# Patient Record
Sex: FEMALE | Race: BLACK OR AFRICAN AMERICAN | URBAN - METROPOLITAN AREA
[De-identification: names, ages, dates, MRNs, and addresses within clinical notes are randomized per-mention and may not be internally consistent; named-entity substitution may affect disease eponyms.]

---

## 2018-10-23 ENCOUNTER — HISTORICAL (OUTPATIENT)
Dept: ADMINISTRATIVE | Facility: HOSPITAL | Age: 28
End: 2018-10-23

## 2018-10-25 LAB — FINAL CULTURE: NORMAL

## 2024-05-26 ENCOUNTER — HOSPITAL ENCOUNTER (EMERGENCY)
Facility: HOSPITAL | Age: 34
Discharge: HOME | End: 2024-05-26
Attending: EMERGENCY MEDICINE
Payer: MEDICAID

## 2024-05-26 VITALS
TEMPERATURE: 98 F | DIASTOLIC BLOOD PRESSURE: 67 MMHG | OXYGEN SATURATION: 100 % | RESPIRATION RATE: 18 BRPM | HEART RATE: 72 BPM | BODY MASS INDEX: 44.41 KG/M2 | WEIGHT: 260.13 LBS | HEIGHT: 64 IN | SYSTOLIC BLOOD PRESSURE: 149 MMHG

## 2024-05-26 DIAGNOSIS — O21.9 NAUSEA AND VOMITING IN PREGNANCY: Primary | ICD-10-CM

## 2024-05-26 LAB
ALBUMIN SERPL-MCNC: 3.7 G/DL (ref 3.5–5)
ALBUMIN/GLOB SERPL: 1 RATIO (ref 1.1–2)
ALP SERPL-CCNC: 94 UNIT/L (ref 40–150)
ALT SERPL-CCNC: 7 UNIT/L (ref 0–55)
ANION GAP SERPL CALC-SCNC: 10 MEQ/L
AST SERPL-CCNC: 10 UNIT/L (ref 5–34)
B-HCG UR QL: POSITIVE
BACTERIA #/AREA URNS AUTO: ABNORMAL /HPF
BASOPHILS # BLD AUTO: 0.02 X10(3)/MCL
BASOPHILS NFR BLD AUTO: 0.2 %
BILIRUB SERPL-MCNC: 0.6 MG/DL
BILIRUB UR QL STRIP.AUTO: NEGATIVE
BUN SERPL-MCNC: 6.9 MG/DL (ref 7–18.7)
CALCIUM SERPL-MCNC: 9.3 MG/DL (ref 8.4–10.2)
CHLORIDE SERPL-SCNC: 106 MMOL/L (ref 98–107)
CLARITY UR: CLEAR
CO2 SERPL-SCNC: 23 MMOL/L (ref 22–29)
COLOR UR AUTO: YELLOW
CREAT SERPL-MCNC: 0.77 MG/DL (ref 0.55–1.02)
CREAT/UREA NIT SERPL: 9
CTP QC/QA: YES
EOSINOPHIL # BLD AUTO: 0.06 X10(3)/MCL (ref 0–0.9)
EOSINOPHIL NFR BLD AUTO: 0.7 %
ERYTHROCYTE [DISTWIDTH] IN BLOOD BY AUTOMATED COUNT: 12.7 % (ref 11.5–17)
GFR SERPLBLD CREATININE-BSD FMLA CKD-EPI: >60 ML/MIN/1.73/M2
GLOBULIN SER-MCNC: 3.8 GM/DL (ref 2.4–3.5)
GLUCOSE SERPL-MCNC: 90 MG/DL (ref 74–100)
GLUCOSE UR QL STRIP: NORMAL
HCT VFR BLD AUTO: 36 % (ref 37–47)
HGB BLD-MCNC: 12.1 G/DL (ref 12–16)
HGB UR QL STRIP: NEGATIVE
HOLD SPECIMEN: NORMAL
HYALINE CASTS #/AREA URNS LPF: ABNORMAL /LPF
IMM GRANULOCYTES # BLD AUTO: 0.02 X10(3)/MCL (ref 0–0.04)
IMM GRANULOCYTES NFR BLD AUTO: 0.2 %
KETONES UR QL STRIP: ABNORMAL
LEUKOCYTE ESTERASE UR QL STRIP: NEGATIVE
LIPASE SERPL-CCNC: 12 U/L
LYMPHOCYTES # BLD AUTO: 1.66 X10(3)/MCL (ref 0.6–4.6)
LYMPHOCYTES NFR BLD AUTO: 20.2 %
MCH RBC QN AUTO: 28 PG (ref 27–31)
MCHC RBC AUTO-ENTMCNC: 33.6 G/DL (ref 33–36)
MCV RBC AUTO: 83.3 FL (ref 80–94)
MONOCYTES # BLD AUTO: 0.65 X10(3)/MCL (ref 0.1–1.3)
MONOCYTES NFR BLD AUTO: 7.9 %
MUCOUS THREADS URNS QL MICRO: ABNORMAL /LPF
NEUTROPHILS # BLD AUTO: 5.82 X10(3)/MCL (ref 2.1–9.2)
NEUTROPHILS NFR BLD AUTO: 70.8 %
NITRITE UR QL STRIP: NEGATIVE
NRBC BLD AUTO-RTO: 0 %
PH UR STRIP: 8 [PH]
PLATELET # BLD AUTO: 319 X10(3)/MCL (ref 130–400)
PMV BLD AUTO: 10.1 FL (ref 7.4–10.4)
POTASSIUM SERPL-SCNC: 3.3 MMOL/L (ref 3.5–5.1)
PROT SERPL-MCNC: 7.5 GM/DL (ref 6.4–8.3)
PROT UR QL STRIP: ABNORMAL
RBC # BLD AUTO: 4.32 X10(6)/MCL (ref 4.2–5.4)
RBC #/AREA URNS AUTO: ABNORMAL /HPF
SODIUM SERPL-SCNC: 139 MMOL/L (ref 136–145)
SP GR UR STRIP.AUTO: 1.03 (ref 1–1.03)
SQUAMOUS #/AREA URNS LPF: ABNORMAL /HPF
UROBILINOGEN UR STRIP-ACNC: ABNORMAL
WBC # SPEC AUTO: 8.23 X10(3)/MCL (ref 4.5–11.5)
WBC #/AREA URNS AUTO: ABNORMAL /HPF

## 2024-05-26 PROCEDURE — 63600175 PHARM REV CODE 636 W HCPCS: Performed by: PHYSICIAN ASSISTANT

## 2024-05-26 PROCEDURE — 99284 EMERGENCY DEPT VISIT MOD MDM: CPT

## 2024-05-26 PROCEDURE — 81001 URINALYSIS AUTO W/SCOPE: CPT | Performed by: PHYSICIAN ASSISTANT

## 2024-05-26 PROCEDURE — 96361 HYDRATE IV INFUSION ADD-ON: CPT

## 2024-05-26 PROCEDURE — 25000003 PHARM REV CODE 250: Performed by: PHYSICIAN ASSISTANT

## 2024-05-26 PROCEDURE — 96374 THER/PROPH/DIAG INJ IV PUSH: CPT

## 2024-05-26 PROCEDURE — 85025 COMPLETE CBC W/AUTO DIFF WBC: CPT | Performed by: PHYSICIAN ASSISTANT

## 2024-05-26 PROCEDURE — 81025 URINE PREGNANCY TEST: CPT | Performed by: EMERGENCY MEDICINE

## 2024-05-26 PROCEDURE — 80053 COMPREHEN METABOLIC PANEL: CPT | Performed by: PHYSICIAN ASSISTANT

## 2024-05-26 PROCEDURE — 83690 ASSAY OF LIPASE: CPT | Performed by: PHYSICIAN ASSISTANT

## 2024-05-26 RX ORDER — ONDANSETRON 4 MG/1
4 TABLET, ORALLY DISINTEGRATING ORAL EVERY 8 HOURS PRN
Qty: 15 TABLET | Refills: 0 | Status: SHIPPED | OUTPATIENT
Start: 2024-05-26

## 2024-05-26 RX ORDER — METOCLOPRAMIDE HYDROCHLORIDE 5 MG/ML
10 INJECTION INTRAMUSCULAR; INTRAVENOUS
Status: COMPLETED | OUTPATIENT
Start: 2024-05-26 | End: 2024-05-26

## 2024-05-26 RX ORDER — LANOLIN ALCOHOL/MO/W.PET/CERES
50 CREAM (GRAM) TOPICAL DAILY
Qty: 30 TABLET | Refills: 0 | Status: SHIPPED | OUTPATIENT
Start: 2024-05-26

## 2024-05-26 RX ADMIN — SODIUM CHLORIDE 1000 ML: 9 INJECTION, SOLUTION INTRAVENOUS at 10:05

## 2024-05-26 RX ADMIN — METOCLOPRAMIDE HYDROCHLORIDE 10 MG: 5 INJECTION INTRAMUSCULAR; INTRAVENOUS at 10:05

## 2024-05-26 NOTE — Clinical Note
"Linn Bartlett" Karely was seen and treated in our emergency department on 5/26/2024.  She may return to work on 05/27/2024.       If you have any questions or concerns, please don't hesitate to call.       RN    "

## 2024-05-26 NOTE — ED PROVIDER NOTES
Encounter Date: 2024       History     Chief Complaint   Patient presents with    Vomiting    Nausea     Pt 9 weeks pregnant co persistant nausea and vomiting , given phenergen by her ob but did not take today , negative orthostatic vitals      Linn Cali is a 33 y.o. A1  who is approximately 9 weeks pregnant who presents to the ED complaining of nausea and vomiting x 4 days. Reports she has been unable to keep anything down. Notified her OB who prescribed her phenergan, but she has been vomiting it up so it is not helping. Denies abdominal pain, vaginal bleeding, dysuria, fevers/chills.     The history is provided by the patient.     Review of patient's allergies indicates:  No Known Allergies  No past medical history on file.  No past surgical history on file.  No family history on file.     Review of Systems   Constitutional:  Negative for chills and fever.   HENT:  Negative for congestion and sore throat.    Respiratory:  Negative for cough and shortness of breath.    Cardiovascular:  Negative for chest pain and leg swelling.   Gastrointestinal:  Positive for nausea and vomiting. Negative for abdominal pain.   Genitourinary:  Negative for dysuria.   Musculoskeletal:  Negative for back pain.   Skin:  Negative for rash.   Neurological:  Negative for weakness.   Hematological:  Does not bruise/bleed easily.       Physical Exam     Initial Vitals   BP Pulse Resp Temp SpO2   24 0843 24 0843 24 0852 24 0843 24 0843   (!) 149/84 91 18 97.9 °F (36.6 °C) 99 %      MAP       --                Physical Exam    Nursing note and vitals reviewed.  Constitutional: She appears well-developed and well-nourished. No distress.   HENT:   Head: Normocephalic and atraumatic.   Mouth/Throat: No oropharyngeal exudate.   Eyes: EOM are normal. No scleral icterus.   Neck: Neck supple.   Normal range of motion.  Cardiovascular:  Normal rate and regular rhythm.           No murmur  heard.  Pulmonary/Chest: Breath sounds normal. No respiratory distress. She has no wheezes.   Abdominal: Abdomen is soft. Bowel sounds are normal. She exhibits no distension. There is no abdominal tenderness. There is no rebound and no guarding.   Musculoskeletal:         General: No tenderness. Normal range of motion.      Cervical back: Normal range of motion and neck supple.     Neurological: She is alert and oriented to person, place, and time. No cranial nerve deficit.   Skin: Skin is warm and dry. Capillary refill takes less than 2 seconds. No erythema.   Psychiatric: She has a normal mood and affect. Her behavior is normal. Judgment and thought content normal.         ED Course   Procedures  Labs Reviewed   COMPREHENSIVE METABOLIC PANEL - Abnormal; Notable for the following components:       Result Value    Potassium 3.3 (*)     Blood Urea Nitrogen 6.9 (*)     Globulin 3.8 (*)     Albumin/Globulin Ratio 1.0 (*)     All other components within normal limits   URINALYSIS, REFLEX TO URINE CULTURE - Abnormal; Notable for the following components:    Specific Gravity, UA 1.032 (*)     Protein, UA 1+ (*)     Ketones, UA Trace (*)     Urobilinogen, UA 3+ (*)     Bacteria, UA Trace (*)     Squamous Epithelial Cells, UA Few (*)     Mucous, UA Few (*)     All other components within normal limits   CBC WITH DIFFERENTIAL - Abnormal; Notable for the following components:    Hct 36.0 (*)     All other components within normal limits   POCT URINE PREGNANCY - Abnormal; Notable for the following components:    POC Preg Test, Ur Positive (*)     All other components within normal limits   LIPASE - Normal   CBC W/ AUTO DIFFERENTIAL    Narrative:     The following orders were created for panel order CBC auto differential.  Procedure                               Abnormality         Status                     ---------                               -----------         ------                     CBC with Differential[[id number]]         Abnormal            Final result                 Please view results for these tests on the individual orders.   EXTRA TUBES    Narrative:     The following orders were created for panel order EXTRA TUBES.  Procedure                               Abnormality         Status                     ---------                               -----------         ------                     Light Blue Top Hold[[id number]]                              In process                 Red Top Hold[[id number]]                                     In process                 Gold Top Hold[[id number]]                                    In process                 Pink Top Hold[[id number]]                                    In process                   Please view results for these tests on the individual orders.   LIGHT BLUE TOP HOLD   RED TOP HOLD   GOLD TOP HOLD   PINK TOP HOLD          Imaging Results    None          Medications   sodium chloride 0.9% bolus 1,000 mL 1,000 mL (0 mLs Intravenous Stopped 5/26/24 1207)   metoclopramide injection 10 mg (10 mg Intravenous Given 5/26/24 1043)     Medical Decision Making  Differential: hyperemesis gravidarum, nausea and vomiting in pregnancy, viral gastroenteritis, among others    ED management: HDS and afebrile. Abdomen soft, non-tender. No rebound or guarding. Labs unremarkable. UA without infection.  bpm. Pt symptoms improved with IVF and reglan. Pt has appointment with her OBGYN, Dr. Mortensen, on Wednesday. Will presribe short course of zofran. Instructed to start taking vitamin B6 and unisom nightly. ED return precautions given. She verbalized understanding. All questions answered.     Amount and/or Complexity of Data Reviewed  Labs: ordered.    Risk  OTC drugs.  Prescription drug management.               ED Course as of 05/26/24 1208   Sun May 26, 2024   1115 Bedside US performed.  bpm [KD]   1159 Pt re-evaluated at this time. Reports feeling much better. Has not vomited  since being in the ED. Tolerating PO [KD]      ED Course User Index  [KD] Yoselin Avila PA-C                           Clinical Impression:  Final diagnoses:  [O21.9] Nausea and vomiting in pregnancy (Primary)          ED Disposition Condition    Discharge Stable          ED Prescriptions       Medication Sig Dispense Start Date End Date Auth. Provider    pyridoxine, vitamin B6, (B-6) 50 MG Tab Take 1 tablet (50 mg total) by mouth once daily. 30 tablet 5/26/2024 -- Yoselin Avila PA-C    doxylamine succinate (UNISOM, DOXYLAMINE,) 25 mg tablet Take 1 tablet (25 mg total) by mouth every evening. 30 tablet 5/26/2024 -- Yoselin Avila PA-C    ondansetron (ZOFRAN-ODT) 4 MG TbDL Take 1 tablet (4 mg total) by mouth every 8 (eight) hours as needed (nausea/vomiting). 15 tablet 5/26/2024 -- Yoselin Avila PA-C          Follow-up Information       Follow up With Specialties Details Why Contact Info    Josue Mortensen MD Obstetrics and Gynecology On 5/29/2024 as scheduled [address]  [address]  [facility] [id number]  [phone]               Yoselin Avila PA-C  05/26/24 2938

## 2024-06-26 DIAGNOSIS — O09.522 ADVANCED MATERNAL AGE IN MULTIGRAVIDA, SECOND TRIMESTER: Primary | ICD-10-CM

## 2024-06-26 DIAGNOSIS — O99.212 OBESITY AFFECTING PREGNANCY IN SECOND TRIMESTER, UNSPECIFIED OBESITY TYPE: ICD-10-CM

## 2024-07-10 DIAGNOSIS — O99.212 OBESITY AFFECTING PREGNANCY IN SECOND TRIMESTER, UNSPECIFIED OBESITY TYPE: ICD-10-CM

## 2024-07-10 DIAGNOSIS — O09.522 ADVANCED MATERNAL AGE IN MULTIGRAVIDA, SECOND TRIMESTER: Primary | ICD-10-CM

## 2024-07-10 DIAGNOSIS — Z36.89 ENCOUNTER FOR FETAL ANATOMIC SURVEY: ICD-10-CM

## 2024-07-16 ENCOUNTER — OFFICE VISIT (OUTPATIENT)
Dept: MATERNAL FETAL MEDICINE | Facility: CLINIC | Age: 34
End: 2024-07-16
Payer: MEDICAID

## 2024-07-16 ENCOUNTER — PROCEDURE VISIT (OUTPATIENT)
Dept: MATERNAL FETAL MEDICINE | Facility: CLINIC | Age: 34
End: 2024-07-16
Payer: MEDICAID

## 2024-07-16 VITALS
DIASTOLIC BLOOD PRESSURE: 82 MMHG | BODY MASS INDEX: 44.59 KG/M2 | SYSTOLIC BLOOD PRESSURE: 130 MMHG | HEIGHT: 64 IN | WEIGHT: 261.19 LBS | HEART RATE: 76 BPM

## 2024-07-16 DIAGNOSIS — O09.90 AT HIGH RISK FOR COMPLICATIONS OF INTRAUTERINE PREGNANCY (IUP): ICD-10-CM

## 2024-07-16 DIAGNOSIS — O09.892 CYSTIC FIBROSIS CARRIER IN SECOND TRIMESTER, ANTEPARTUM: ICD-10-CM

## 2024-07-16 DIAGNOSIS — O09.522 MULTIGRAVIDA OF ADVANCED MATERNAL AGE IN SECOND TRIMESTER: Primary | ICD-10-CM

## 2024-07-16 DIAGNOSIS — Z14.1 CYSTIC FIBROSIS CARRIER IN SECOND TRIMESTER, ANTEPARTUM: ICD-10-CM

## 2024-07-16 DIAGNOSIS — O09.522 ADVANCED MATERNAL AGE IN MULTIGRAVIDA, SECOND TRIMESTER: ICD-10-CM

## 2024-07-16 DIAGNOSIS — Z36.89 ENCOUNTER FOR FETAL ANATOMIC SURVEY: ICD-10-CM

## 2024-07-16 DIAGNOSIS — O99.212 OBESITY AFFECTING PREGNANCY IN SECOND TRIMESTER, UNSPECIFIED OBESITY TYPE: ICD-10-CM

## 2024-07-16 DIAGNOSIS — O10.012 PRE-EXISTING ESSENTIAL HYPERTENSION AFFECTING PREGNANCY IN SECOND TRIMESTER: ICD-10-CM

## 2024-07-16 DIAGNOSIS — E66.01 SEVERE OBESITY DUE TO EXCESS CALORIES AFFECTING PREGNANCY IN SECOND TRIMESTER: ICD-10-CM

## 2024-07-16 DIAGNOSIS — O09.299 HISTORY OF POSTPARTUM HEMORRHAGE, CURRENTLY PREGNANT: ICD-10-CM

## 2024-07-16 DIAGNOSIS — O34.219 PREVIOUS CESAREAN DELIVERY AFFECTING PREGNANCY: ICD-10-CM

## 2024-07-16 DIAGNOSIS — O99.212 SEVERE OBESITY DUE TO EXCESS CALORIES AFFECTING PREGNANCY IN SECOND TRIMESTER: ICD-10-CM

## 2024-07-16 DIAGNOSIS — O09.42 GRAND MULTIPARITY WITH CURRENT PREGNANCY IN SECOND TRIMESTER: ICD-10-CM

## 2024-07-16 PROCEDURE — 3075F SYST BP GE 130 - 139MM HG: CPT | Mod: CPTII,S$GLB,, | Performed by: OBSTETRICS & GYNECOLOGY

## 2024-07-16 PROCEDURE — 3008F BODY MASS INDEX DOCD: CPT | Mod: CPTII,S$GLB,, | Performed by: OBSTETRICS & GYNECOLOGY

## 2024-07-16 PROCEDURE — 1159F MED LIST DOCD IN RCRD: CPT | Mod: CPTII,S$GLB,, | Performed by: OBSTETRICS & GYNECOLOGY

## 2024-07-16 PROCEDURE — 1160F RVW MEDS BY RX/DR IN RCRD: CPT | Mod: CPTII,S$GLB,, | Performed by: OBSTETRICS & GYNECOLOGY

## 2024-07-16 PROCEDURE — 76815 OB US LIMITED FETUS(S): CPT | Mod: S$GLB,,, | Performed by: OBSTETRICS & GYNECOLOGY

## 2024-07-16 PROCEDURE — 99204 OFFICE O/P NEW MOD 45 MIN: CPT | Mod: 25,TH,S$GLB, | Performed by: OBSTETRICS & GYNECOLOGY

## 2024-07-16 PROCEDURE — 3079F DIAST BP 80-89 MM HG: CPT | Mod: CPTII,S$GLB,, | Performed by: OBSTETRICS & GYNECOLOGY

## 2024-07-16 RX ORDER — PROMETHAZINE HYDROCHLORIDE 25 MG/1
25 TABLET ORAL EVERY 4 HOURS PRN
COMMUNITY
Start: 2024-06-18

## 2024-07-16 RX ORDER — ASPIRIN 81 MG/1
81 TABLET ORAL 2 TIMES DAILY
Qty: 60 TABLET | Refills: 11 | Status: SHIPPED | OUTPATIENT
Start: 2024-07-16 | End: 2025-07-16

## 2024-07-16 RX ORDER — CETIRIZINE HYDROCHLORIDE 10 MG/1
10 TABLET ORAL EVERY MORNING
COMMUNITY
Start: 2024-06-18

## 2024-07-16 NOTE — PROGRESS NOTES
Maternal Fetal Medicine New Consult    Subjective:     Patient ID: [id number]    Chief Complaint: mfm consult w/us (AMA, elevated bmi > 40)      HPI: 33 y.o.  female  at 16w6d gestation with Estimated Date of Delivery: 24 by early US, not consistent with LMP. She is sent for MFM consultation for AMA, increased BMI.      She is of advanced maternal age and will be 34 by the DARY.  She reports she did cell free DNA with primary OB but unsure of the result.  She has increased BMI of 44.83 at initial MFM consult visit.  She had normal early 1 hour GCT.  She has history of chronic hypertension diagnosed in .  She reports is prescribed amlodipine but has not been taking it for the last few weeks.  She is currently on no antihypertensives. She has had 4 previous C-sections.  She reports history of blood transfusion after 2nd or 3rd  due to hemorrhage.  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       She denies any personal or family history of aneuploidy or anomalies. She denies any exposure to high fevers, viral rashes, illicit drugs or alcohol in this pregnancy.  She denies any leaking fluid, vaginal bleeding, contractions, decreased fetal movement. Denies headaches, visual disturbances, or epigastric pain.       Pregnancy complications include:  Patient Active Problem List   Diagnosis    Multigravida of advanced maternal age in second trimester    Pre-existing essential hypertension affecting pregnancy in second trimester    BMI over 40 affecting pregnancy in second trimester    At high risk for complications of intrauterine pregnancy (IUP)    Grand multiparity with current pregnancy in second trimester    Previous  delivery affecting pregnancy    History of postpartum hemorrhage, currently pregnant       Past Medical History:   Diagnosis Date    Hypertension     currently       Past Surgical History:   Procedure Laterality Date      "SECTION      2008, , ,     DILATION AND CURETTAGE OF UTERUS      WISDOM TOOTH EXTRACTION         Family History   Problem Relation Name Age of Onset    Diabetes Maternal Grandmother         Social History     Socioeconomic History    Marital status: Single   Tobacco Use    Smoking status: Former     Types: Cigarettes, Vaping with nicotine     Start date:      Quit date:      Years since quittin.5     Passive exposure: Current    Smokeless tobacco: Never   Substance and Sexual Activity    Alcohol use: Not Currently    Drug use: Never    Sexual activity: Yes     Partners: Male       Current Outpatient Medications   Medication Sig Dispense Refill    cetirizine (ZYRTEC) 10 MG tablet Take 10 mg by mouth every morning.      PNV no.153/FA/om3/dha/epa/fish (PRENATAL GUMMIES ORAL) Take by mouth.      promethazine (PHENERGAN) 25 MG tablet Take 25 mg by mouth every 4 (four) hours as needed.      doxylamine succinate (UNISOM, DOXYLAMINE,) 25 mg tablet Take 1 tablet (25 mg total) by mouth every evening. (Patient not taking: Reported on 2024) 30 tablet 0    ondansetron (ZOFRAN-ODT) 4 MG TbDL Take 1 tablet (4 mg total) by mouth every 8 (eight) hours as needed (nausea/vomiting). (Patient not taking: Reported on 2024) 15 tablet 0    pyridoxine, vitamin B6, (B-6) 50 MG Tab Take 1 tablet (50 mg total) by mouth once daily. (Patient not taking: Reported on 2024) 30 tablet 0     No current facility-administered medications for this visit.       Review of patient's allergies indicates:  No Known Allergies     Review of Systems   12 point review of systems conducted, negative except as stated in the history of present illness. See HPI for details.      Objective:     Visit Vitals  /82 (BP Location: Left arm, Patient Position: Sitting, BP Method: X-Large (Automatic))   Pulse 76   Ht 5' 4" (1.626 m)   Wt 118.5 kg (261 lb 3.2 oz)   LMP 2024   BMI 44.83 kg/m²        Physical " Exam  Vitals and nursing note reviewed.   Constitutional:       General: She is not in acute distress.     Appearance: Normal appearance.      Comments: Increased BMI   HENT:      Head: Normocephalic and atraumatic.      Nose: Nose normal. No congestion.      Mouth/Throat:      Pharynx: Oropharynx is clear.   Eyes:      General: No scleral icterus.     Conjunctiva/sclera: Conjunctivae normal.   Cardiovascular:      Rate and Rhythm: Normal rate and regular rhythm.   Pulmonary:      Effort: No respiratory distress.      Breath sounds: Normal breath sounds. No wheezing.   Abdominal:      General: Abdomen is flat.      Palpations: Abdomen is soft.      Tenderness: There is no abdominal tenderness. There is no right CVA tenderness, left CVA tenderness or guarding.      Comments: No CVA tenderness gravid uterus.    Musculoskeletal:         General: Normal range of motion.      Cervical back: Neck supple.      Right lower leg: No edema.      Left lower leg: No edema.   Skin:     General: Skin is warm.      Findings: No bruising or rash.   Neurological:      General: No focal deficit present.      Mental Status: She is oriented to person, place, and time.      Deep Tendon Reflexes: Reflexes normal.      Comments: Normal reflexes   Psychiatric:         Mood and Affect: Mood normal.         Behavior: Behavior normal.         Thought Content: Thought content normal.         Judgment: Judgment normal.         Assessment/Plan:     33 y.o.  female with IUP at 16w6d    Advanced maternal age at 34  I discussed with her that the higher risk of aneuploidy related to advanced maternal age is caused by meiotic nondysjunction.  At this maternal  age, the risk of Down syndrome quoted at 1:353 and the risk of any chromosomal problems quoted at 1:178. She would not consider termination of pregnancy even if anomalies or aneuploidy is detected. She was advised of the screening nature of the Level 2 ultrasound as well as the screening  nature of a quad screen to check for the risk of aneuploidy with normal ultrasound and or quad screen testing that would lower the background risk of aneuploidy.        She was counseled on Cell free DNA understanding that it is an enhanced screening test but not a diagnostic test. It assesses risk for common aneuploidies such as Trisomy 13, 18, and 21 by evaluating cell-free fetal DNA in maternal circulation with a false positive rate less than 0.5% for Trisomy 21 and less reliable for Trisomy 13 and Trisomy 18. She thinks she did cell free DNA, requested report..      She was advised that the only diagnostic test at this time is genetic amniocentesis.  Benefits and risks of a genetic amniocentesis were discussed. Patient was offered genetic amniocentesis, after thorough counseling on benefits and risks of procedure, with very remote risk of complications and in some studies no identifiable increased risk, above background risk of spontaneous miscarriage, while some current data suggests risk up to 1 in 800 with early amniocentesis prior to 24 weeks, and remote risk of need for emergency delivery with all the complications of prematurity with amniocentesis after 24 weeks.     I also discussed with them that cell free DNA will not detect other genetic diseases or more subtle chromosomal abnormalities like microdeletions or duplications. The added benefit of doing chromosomal microarray analysis on amniotic fluid is that it would detect clinically relevant deletions or duplications in about 1:60 (1.7%) when the indication is abnormal quad screen or advanced maternal age, and 6.0 % when a structural anomaly is present. The concern about microarray analysis is that it could give uncertain findings in about 1.5-2.0% of cases that would increase anxiety and may require specialized genetic counseling.     In a recent study from 2018, clinically significant chromosomal microarray aberrations were seen in 4.7% of  pregnancies with US anomalies (excluding soft markers for aneuploidy), but including patients with isolated FGR and polyhydramnios. The most common abnormality seen with cardiovascular defects was 22q11.21 deletion (DiGeorge-velocardiofacial syndrome), and the most common abnormality among genitourinary malformations was 17q12 deletion (encompassing HFNF1B). Aberrations were present in 13.1% if multiple anomalies, and around 4 % if single ultrasonographic anomaly.     She declined amniocentesis . She is aware of the need for  evaluation.    The higher risk of anomalies associated with advanced maternal age was also addressed. The reassurance obtained by the normal ultrasound and the limitations of ultrasound in checking for anomalies was explained with the need for regular  evaluations.     I recommend targeted anatomical ultrasound at 18-20 weeks, and fetal growth ultrasound as below. She was advised to do fetal kick counts 3 times daily and PRN after 24 weeks, with immediate reporting of decreased fetal movements (<10 movements/hr).      Chronic hypertension  Chronic hypertension complicates up to 2-5% of pregnancies and is associated with significant adverse pregnancy outcomes including  birth, fetal growth restriction, fetal demise, placental abruption, and  delivery. Recent data suggest higher risk of certain congenital anomalies, including, heart defects, hypospadias and esophageal atresia. The incidence of adverse outcomes seen in pregnancies complicated by chronic hypertension is related to the degree and duration of hypertension and to the association of other organ system involvement or damage. Most pregnant women with mild chronic hypertension have uneventful pregnancies with no end-organ involvement. Comparing women who developed superimposed preeclampsia with women who did not, the incidence of  birth was 100% versus 38%, the incidence of small-for-gestational-age  "(SGA) infants was 78% versus 15%, and the  mortality rate was 48% versus 0%. Besides superimposed preeclampsia or eclampsia, pregnancy complicated by chronic hypertension (especially if severe) may be associated with worsening or malignant hypertension, central nervous system hemorrhage, cardiac decompensation, and renal deterioration or failure.    The age of onset, results of previous evaluation, severity and duration of hypertension, and physical examination are important determinants of which clinical tests may be useful. Ideally, a woman with chronic hypertension should be evaluated before pregnancy to ascertain potentially reversible causes and end-organ involvement (eg, heart or kidney). Baseline laboratory tests may include serum creatinine, blood urea nitrogen, electrolytes, CBC and 24-hour urine evaluation for total protein and creatinine clearance. Women who have had hypertension for several years are more likely to have cardiomegaly, ischemic heart disease, renal involvement, and retinopathy. In patients with severe disease over 4 years, or long standing hypertension (typically if over 30 years old), tests which may prove useful in the evaluation of these women include electrocardiography, echocardiography, ophthalmologic examination, and renal ultrasonography.     Women with paroxysmal hypertension, frequent "hypertensive crisis," seizure disorders, or anxiety attacks should be evaluated for pheochromocytoma with measurements of 24-hour urine vanillylmandelic acid, metanephrines, or unconjugated catecholamines. Magnetic resonance imaging after the first trimester or computed tomography may be useful for adrenal tumor localization. Renal artery stenosis appears to be more prevalent in patients with type-2 diabetes and coexistent hypertension. Doppler flow studies or magnetic resonance angiography are helpful to detect renal artery stenosis. Negative results from renal ultrasonography do not " exclude renal artery stenosis.     In women with chronic hypertension, it can be difficult to discern worsening hypertension that might occur as a result of physiological changes of pregnancy in the third trimester from the development of preeclampsia. The use of certain laboratory tests such as serum creatinine, liver functions tests, hematocrit and platelets to compare to baseline values from early in pregnancy might serve to delineate these conditions. Routine screening of women with chronic hypertension with these laboratory tests is not routinely indicated.    I have shared with her the normal natural course of chronic hypertension in pregnancy with improvement early on and likely increasing blood pressure as the pregnancy advances. Based on findings of recent CHAP Study, it is recommended to utilize 140/90 as the threshold for initiation or titration of medical therapy for chronic hypertension in pregnancy, rather than the previously recommended threshold of 160/110. For patients on blood pressure medications at the start of pregnancy, in the absence of mitigating factors or side effects, they can be maintained on their medications, rather than discontinuing them and waiting to initiate treatment for blood pressures in the severe range. Commonly used medications include nifedipine and labetalol.    BP Readings from Last 1 Encounters:   07/16/24 130/82     With this blood pressure, she was advised to follow low sodium diet with no medication at this time.  She is also to follow a low sodium diet avoiding any excessive weight gain.     Use aspirin as discussed.    She was advised of the higher risk of fetal growth restriction and superimposed preeclampsia with her underlying chronic hypertension. The risk of placental abruption was also discussed. No prevention is available with her reporting any bleeding or cramping. She was also advised to report any headaches, visual problems, epigastric pain.    There is no  consensus as to the most appropriate fetal surveillance test(s) or the interval and timing of testing in  with chronic hypertension. Thus, such testing should be individualized and based on clinical judgment and on severity of disease.    We will plan to do a level 2 scan around 20 weeks understanding the limitations of ultrasound in checking anomalies and follow-up ultrasounds to monitor growth around 24-26 weeks and then every 4 weeks until the end of pregnancy. Recommend fetal testing starting around 32 weeks gestation until the end of pregnancy    Among patients with uncomplicated chronic hypertension with no additional risk factors, delivery at 38-39 weeks appears to provide optimal balance between the risk of adverse fetal and  outcomes. If no medication and normal fetal assessment, recommend delivery at 39 weeks. However, will reassess closer to EDC to determine optimal timeframe or GA for delivery, based on current evaluation at that time.       Increased BMI over 40  Body mass index is 44.83 kg/m².    I discussed with her the risk of first trimester miscarriage, recurrent miscarriages, congenital anomalies, hypertensive disorders, gestational diabetes,  delivery (BMI>35), macrosomia, higher risk of fetal demise in-utero and higher risk of . She was advised of the importance of eating healthy and and limiting weight gain to 11-15 lbs during the pregnancy, as optimal in this situation.  I recommended low calorie, low fat diet avoiding any additional excessive weight gain.  Excess weight gain would be associated with gestational hypertension, gestational diabetes and adverse  outcomes, including fetal demise in utero.    Low dose aspirin as discussed.    I recommend 1 hour GCT between 24-28 weeks' gestation.    With elevated BMI, fetal testing as discussed elsewhere. She was advised to do fetal kick counts 3 times daily and PRN after 24 weeks, with immediate reporting of  decreased fetal movements (<10 movements/hr).    Preoperative antibiotics and thromboprophylaxis with use of pneumatic compression devices is recommend in those with very elevated BMI undergoing  delivery. Thromboprophylaxis should also be used with those on prolonged immobilization.    Discussed the importance of losing weight after this pregnancy, especially before attempting future pregnancy. Breastfeeding may be an important tool in reducing the postpartum weight retention. Fetal risks were discussed with short term risk of fetal/ obesity and long term risk of adolescent component of metabolic syndrome.       History of 4 previous C-sections\  Discussed risk with previous  section, including improper placental implantation and abnormalities such as accreta, placental abruption and risks of uterine rupture with labor with need for emergency  section with  morbidity/mortality associated with that. She is to report any significant cramping or vaginal bleeding.       With previous  section, there is risk for placenta accreta that may not be detected antenatally. There is option to consider MRI if suspicion for placental abnormality. If no suspicion for placental abnormality, no MRI will be recommended and will do expectant management, understanding that neither the MRI nor the US is 100% sensitive or specific. Actually, most current data, suggest that an MRI is not superior to us, and should not be routinely done if suspected acreta, as both false positive and false negative are increased with such an approach.    With repeat cesareans, standard of care is to deliver at 39 weeks, if there are no other obstetric risk factor for earlier delivery. With multiple  sections, there is greater potential for placental abruption, uterine rupture and improper placental implantation not detected antenatally requiring emergency delivery, if labor ensues.    With history of  multiple c-sections, advised to consider avoiding future pregnancy with risks as above. She verbalized understanding.        Grand multiparity with history of postpartum hemorrhage  There is potentially a high risk of recurrence. Expectant management is to be done. Recommended lagging the chart for risk of postpartum hemorrhage and having type and screen on admission at delivery.  Suggest giving utero tonic agents early to prevent the risk of bleeding.       Cystic fibrosis carrier (per patient)  I discussed with her that Cystic fibrosis is an autosomal recessive genetic disorder with chromosome 7 gene mutation, CFTR gene. It primarily affects non-  whites with incidence 1:2500 affecting pulmonary system, pancreas, GI tract and higher risk for infections.    In Caucasians, a negative cystic fibrosis screen has 90% detection rate and 1:240-250 risks of being a carrier of Cystic fibrosis. I informed her of the importance of testing father of baby. If father of baby is a carrier, there is a 1:4 risk for the fetus developing Cystic fibrosis that could be detected by genetic amniocentesis. If father of baby is negative, then amniocentesis will not detect abnormality even if the baby is affected because of mutation that is not detected by current testing. It is important to note that if specific mutation is not known and not tested for, there is still risks for CF even with normal results. However, I discussed risks/benefits and offered genetic amniocentesis understanding that still some variants may not be detected and is based on ethnicity.  Also, discussed that a normal amniocentesis will not detect all the forms of CF as some mutations are not detected by current DNA testing. She declined genetic amniocentesis and testing for FOB with need for  evaluation.       Preeclampsia prophylaxis  With her risk factors for preeclampsia including chronic hypertension , BMI over 30,  ancestry, and low  socioeconomic status, discussed recommendations for low dose aspirin use to decrease risks for adverse outcomes, including preeclampsia, low birth rate and  delivery. Low-dose aspirin reduced the risk for preeclampsia by 15% in clinical trials and reduced the risk for  birth by 20% and FGR by 20%, and  mortality by around 20%.  After discussing risks/benefits of its use, it was agreed to start asa 81 mg BID, due to multiple risk factors for preeclampsia. After discussing benefits (more effective than daily) vs potential risks (less data on safety with use at delivery), she agreed to start low dose aspirin twice daily and continue until 34 6/7weeks, then decrease to once daily until delivery.. Also, recommend using in all future pregnancies.      Genetic counseling regarding advanced maternal age was done.  Patient stated that she had a cell free DNA that was negative.  We are requesting a copy of it.  MSAFP could be done by Dr. Cartwright at his next visit.  Counseled on the management of chronic hypertension its risks.  Patient's blood pressure is normal at this time when we are close to the evaristo of the physiologic blood pressure decrease in pregnancy.  No medication is needed at this time the patient was advised to continue low-salt diet.  If medication could needs to be restarted I will suggest starting Procardia, which we would be similar calcium channel blocker to amlodipine.  Diet advice with proper weight gain recommendations discussed.  With 4 previous  sections, discussed the risks from additional sections and higher morbidity associated with a such a section.  Advised patient to avoid any future pregnancies.  Patient was hesitant to do that and shared with her the potential significant risk with every additional  including placental adherence abnormalities that have significant maternal morbidity mortality risks, as well as other surgical risks and risks of the fetus.   Patient appeared not interested in cystic fibrosis additional evaluation.  Patient's questions were answered.  She is in agreement with plan of care.      Follow up in about 4 weeks (around 8/13/2024) for MFM follow-up, Level 2 scan.     No future appointments.       Patient was evaluated by TRAVIS Doyle and Dr. Patterson.  Final assessment and recommendations as stated above were made by Dr. Patterson.    This note was created with the assistance of [employer] voice recognition software. There may be transcription errors as a result of using this technology, however minimal. Effort has been made to ensure accuracy of transcription, but any obvious errors or omissions should be clarified with the author of the document.

## 2024-08-06 DIAGNOSIS — O09.522 ADVANCED MATERNAL AGE IN MULTIGRAVIDA, SECOND TRIMESTER: Primary | ICD-10-CM

## 2024-08-12 NOTE — PROGRESS NOTES
Maternal Fetal Medicine Follow Up    Subjective:     Patient ID: [id number]    Chief Complaint: M follow up with US      HPI: Linn Cali is a 34 y.o. female  at 20w6d gestation with Estimated Date of Delivery: 24  who is here for follow-up consultation by MFM.    She is of advanced maternal age and will be 34 by the DARY.  She had negative cell free DNA and declined amniocentesis.  She has increased BMI of 44.83 at initial MFM consult visit.  She had normal early 1 hour GCT.  She has history of chronic hypertension diagnosed in . She is currently on no antihypertensives.  She is supposed to be on aspirin twice a day but she is taking on aspirin only 81 mg dailycreening that was positive for cystic fibrosis carrier.  She decided against any testing for FOB or amniocentesis.       Interval history since last MFM visit: None.. She denies any leaking fluid, vaginal bleeding, contractions, decreased fetal movement. Denies headaches, visual disturbances, or epigastric pain.    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    Medications:  Current Outpatient Medications   Medication Instructions    aspirin (ECOTRIN) 81 mg, Oral, 2 times daily    cetirizine (ZYRTEC) 10 mg, Oral, Every morning    doxylamine succinate (UNISOM (DOXYLAMINE)) 25 mg, Oral, Nightly    ondansetron (ZOFRAN-ODT) 4 mg, Oral, Every 8 hours  "PRN    PNV no.153/FA/om3/dha/epa/fish (PRENATAL GUMMIES ORAL) Oral    promethazine (PHENERGAN) 25 mg, Oral, Every 4 hours PRN    pyridoxine (vitamin B6) (B-6) 50 mg, Oral, Daily    terconazole (TERAZOL 3) 0.8 % vaginal cream 1 applicator, Vaginal, Nightly       Review of Systems   12 point review of systems conducted, negative except as stated in the history of present illness. See HPI for details.      Objective:     Visit Vitals  /79 (BP Location: Left arm, Patient Position: Sitting, BP Method: Large (Automatic))   Pulse 77   Ht 5' 4" (1.626 m)   Wt 117 kg (258 lb)   LMP 2024   BMI 44.29 kg/m²        Physical Exam  Vitals and nursing note reviewed.   Constitutional:       Appearance: Normal appearance.      Comments: Increased BMI   HENT:      Head: Normocephalic and atraumatic.      Nose: Nose normal. No congestion.   Cardiovascular:      Rate and Rhythm: Normal rate.   Pulmonary:      Effort: Pulmonary effort is normal.   Skin:     Findings: No rash.   Neurological:      Mental Status: She is alert and oriented to person, place, and time.   Psychiatric:         Mood and Affect: Mood normal.         Behavior: Behavior normal.         Thought Content: Thought content normal.         Judgment: Judgment normal.         Assessment/Plan:     34 y.o.  female with IUP at 20w6d    Advanced maternal age at 34  There is normal fetal growth and no anomalies seen on fetal anatomy scan on 24.  AFV is normal.     Reviewed risks with AMA, including risks for PTL, FGR, anomalies not seen, aneuploidy and stillbirth at term. She previously declined amniocentesis . She is aware of need for  evaluation. She previously had cell free DNA that was negative .    I recommend fetal growth ultrasound in 5 weeks, and we will try to complete fetal anatomy scan at that time.  She was advised to do fetal kick counts 3 times daily and PRN after 24 weeks, with immediate reporting of decreased fetal movements (<10 " movements/hr).      Chronic hypertension  Chronic hypertension is associated with significant adverse pregnancy outcomes including  birth, fetal growth restriction, fetal demise, placental abruption, and  delivery. Based on findings of recent CHAP Study, it is recommended to utilize 140/90 as the threshold for initiation or titration of medical therapy for chronic hypertension in pregnancy, rather than the previously recommended threshold of 160/110. For patients on blood pressure medications at the start of pregnancy, in the absence of mitigating factors or side effects, they can be maintained on their medications, rather than discontinuing them and waiting to initiate treatment for blood pressures in the severe range.    BP Readings from Last 1 Encounters:   24 129/79     With this BP, she was advised to follow low sodium diet with no medication at this time.     Low dose aspirin as discussed.    She would benefit from follow-up ultrasounds to monitor growth around 24-26 weeks and then every 4 weeks until the end of pregnancy. Recommend fetal testing starting around 32 weeks gestation until the end of pregnancy.    Among patients with uncomplicated chronic hypertension with no additional risk factors, delivery at 38-39 weeks appears to provide optimal balance between the risk of adverse fetal and  outcomes. If no medication and normal fetal assessment, recommend delivery at 39 weeks. However, will reassess closer to EDC to determine optimal timeframe or GA for delivery, based on current evaluation at that time.        Increased BMI over 40  Body mass index is 44.29 kg/m². With relatively stable weight recently, she was advised to continue healthy low caloric and low salt diet. Excess weight gain would be associated with gestational hypertension, gestational diabetes and adverse  outcomes, including fetal demise in utero.    I recommend 1 hour GCT between 24-28 weeks'  gestation.    With elevated BMI, fetal testing as discussed elsewhere. She was advised to do fetal kick counts 3 times daily and PRN after 24 weeks, with immediate reporting of decreased fetal movements (<10 movements/hr).    Preoperative antibiotics and thromboprophylaxis with use of pneumatic compression devices is recommend in those with very elevated BMI undergoing  delivery. Thromboprophylaxis should also be used with those on prolonged immobilization.    It is important to lose weight after the pregnancy is over, especially before a future pregnancy was discussed. Breastfeeding may be an important tool in reducing the postpartum weight retention. Fetal risks were discussed with short term risk of fetal/ obesity and long term risk of adolescent component of metabolic syndrome.        History of 4 previous C-sections  She is aware of risks with previous  section, including improper placental implantation and abnormalities such as accreta, placental abruption and risks of uterine rupture with labor with need for emergency  section, and  morbidity/mortality risks associated with that. She is to report any significant cramping or vaginal bleeding.      With history of multiple c-sections, previously advised to consider avoiding future pregnancy with risks as above.        Grand multiparity with history of postpartum hemorrhage  There is potentially a high risk of recurrence. Expectant management is to be done. Recommended lagging the chart for risk of postpartum hemorrhage and having type and screen on admission at delivery.  Suggest giving utero tonic agents early to prevent the risk of bleeding.       Cystic fibrosis carrier  I discussed with her that Cystic fibrosis is an autosomal recessive genetic disorder with chromosome 7 gene mutation, CFTR gene. It primarily affects non-  whites with incidence 1:2500 affecting pulmonary system, pancreas, GI tract and higher  risk for infections.    She declined genetic amniocentesis and testing for FOB with need for  evaluation.       Preeclampsia prophylaxis  With her increased risk for preeclampsia, reviewed with the patient options of aspirin daily versus twice a day with the benefits risks of both options and agreed to do aspirin 81 mg b.i.d. till around 34 weeks gestation then decrease to daily after that. . Preeclampsia precautions reviewed.       Follow up in about 5 weeks (around 2024) for MFM follow-up, Repeat ultrasound, to complete anatomy scan.     No future appointments.       DORENE involvement: Patient was evaluated and examined by Dr. Patterson. TRAVIS Doyle, helped in pre charting of part of note.    This note was created with the assistance of [employer] voice recognition software. There may be transcription errors as a result of using this technology, however minimal. Effort has been made to ensure accuracy of transcription, but any obvious errors or omissions should be clarified with the author of the document.

## 2024-08-13 ENCOUNTER — OFFICE VISIT (OUTPATIENT)
Dept: MATERNAL FETAL MEDICINE | Facility: CLINIC | Age: 34
End: 2024-08-13
Payer: MEDICAID

## 2024-08-13 ENCOUNTER — PROCEDURE VISIT (OUTPATIENT)
Dept: MATERNAL FETAL MEDICINE | Facility: CLINIC | Age: 34
End: 2024-08-13
Payer: MEDICAID

## 2024-08-13 VITALS
WEIGHT: 258 LBS | BODY MASS INDEX: 44.05 KG/M2 | HEART RATE: 77 BPM | HEIGHT: 64 IN | DIASTOLIC BLOOD PRESSURE: 79 MMHG | SYSTOLIC BLOOD PRESSURE: 129 MMHG

## 2024-08-13 DIAGNOSIS — O09.522 MULTIGRAVIDA OF ADVANCED MATERNAL AGE IN SECOND TRIMESTER: ICD-10-CM

## 2024-08-13 DIAGNOSIS — O09.522 ADVANCED MATERNAL AGE IN MULTIGRAVIDA, SECOND TRIMESTER: ICD-10-CM

## 2024-08-13 DIAGNOSIS — O34.219 PREVIOUS CESAREAN DELIVERY AFFECTING PREGNANCY: ICD-10-CM

## 2024-08-13 DIAGNOSIS — Z14.1 CYSTIC FIBROSIS CARRIER IN SECOND TRIMESTER, ANTEPARTUM: ICD-10-CM

## 2024-08-13 DIAGNOSIS — O10.012 PRE-EXISTING ESSENTIAL HYPERTENSION AFFECTING PREGNANCY IN SECOND TRIMESTER: Primary | ICD-10-CM

## 2024-08-13 DIAGNOSIS — O09.299 HISTORY OF POSTPARTUM HEMORRHAGE, CURRENTLY PREGNANT: ICD-10-CM

## 2024-08-13 DIAGNOSIS — O09.42 GRAND MULTIPARITY WITH CURRENT PREGNANCY IN SECOND TRIMESTER: ICD-10-CM

## 2024-08-13 DIAGNOSIS — O99.212 SEVERE OBESITY DUE TO EXCESS CALORIES AFFECTING PREGNANCY IN SECOND TRIMESTER: ICD-10-CM

## 2024-08-13 DIAGNOSIS — E66.01 SEVERE OBESITY DUE TO EXCESS CALORIES AFFECTING PREGNANCY IN SECOND TRIMESTER: ICD-10-CM

## 2024-08-13 DIAGNOSIS — O09.892 CYSTIC FIBROSIS CARRIER IN SECOND TRIMESTER, ANTEPARTUM: ICD-10-CM

## 2024-08-13 PROCEDURE — 3074F SYST BP LT 130 MM HG: CPT | Mod: CPTII,S$GLB,, | Performed by: OBSTETRICS & GYNECOLOGY

## 2024-08-13 PROCEDURE — 76811 OB US DETAILED SNGL FETUS: CPT | Mod: S$GLB,,, | Performed by: OBSTETRICS & GYNECOLOGY

## 2024-08-13 PROCEDURE — 99213 OFFICE O/P EST LOW 20 MIN: CPT | Mod: TH,S$GLB,, | Performed by: OBSTETRICS & GYNECOLOGY

## 2024-08-13 PROCEDURE — 3078F DIAST BP <80 MM HG: CPT | Mod: CPTII,S$GLB,, | Performed by: OBSTETRICS & GYNECOLOGY

## 2024-08-13 PROCEDURE — 3008F BODY MASS INDEX DOCD: CPT | Mod: CPTII,S$GLB,, | Performed by: OBSTETRICS & GYNECOLOGY

## 2024-08-13 PROCEDURE — 1159F MED LIST DOCD IN RCRD: CPT | Mod: CPTII,S$GLB,, | Performed by: OBSTETRICS & GYNECOLOGY

## 2024-08-13 PROCEDURE — 1160F RVW MEDS BY RX/DR IN RCRD: CPT | Mod: CPTII,S$GLB,, | Performed by: OBSTETRICS & GYNECOLOGY

## 2024-08-13 RX ORDER — TERCONAZOLE 8 MG/G
1 CREAM VAGINAL NIGHTLY
COMMUNITY
Start: 2024-08-09

## 2024-09-12 DIAGNOSIS — O10.012 PRE-EXISTING ESSENTIAL HYPERTENSION AFFECTING PREGNANCY IN SECOND TRIMESTER: Primary | ICD-10-CM

## 2024-09-16 NOTE — PROGRESS NOTES
Maternal Fetal Medicine Follow Up    Subjective:     Patient ID: [id number]    Chief Complaint: m followup w/us      HPI: Linn Cali is a 34 y.o. female  at 25w6d gestation with Estimated Date of Delivery: 24  who is here for follow-up consultation by M.    She is of advanced maternal age and will be 34 by the DARY.  She had negative cell free DNA and declined amniocentesis.  She has increased BMI of 44.83 at initial MFM consult visit.  She had normal early 1 hour GCT.  She has history of chronic hypertension diagnosed in . She is currently on no antihypertensives.  She is on prophylactic low-dose aspirin twice daily.  She had carrier screening that was positive for cystic fibrosis carrier.  She decided against any testing for FOB or amniocentesis.       Interval history since last M visit: None.. She denies any leaking fluid, vaginal bleeding, contractions, decreased fetal movement. Denies headaches, visual disturbances, or epigastric pain.    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    Medications:  Current Outpatient Medications   Medication Instructions    ALPRAZolam (XANAX) 0.5 mg, 2 times daily    amLODIPine (NORVASC) 5 mg, Every morning    aspirin (ECOTRIN) 81 mg, Oral, 2 times daily    azelastine (ASTELIN) 137 mcg (0.1 %) nasal spray 2 sprays, 2 times daily    cetirizine (ZYRTEC) 10 mg,  "Every morning    doxylamine succinate (UNISOM (DOXYLAMINE)) 25 mg, Oral, Nightly    M- PLUS 27 mg iron- 1 mg Tab 1 tablet    ondansetron (ZOFRAN-ODT) 4 mg, Oral, Every 8 hours PRN    PNV no.153/FA/om3/dha/epa/fish (PRENATAL GUMMIES ORAL) Oral    promethazine (PHENERGAN) 25 mg, Every 4 hours PRN    pyridoxine (vitamin B6) (B-6) 50 mg, Oral, Daily    sertraline (ZOLOFT) 100 mg    terconazole (TERAZOL 3) 0.8 % vaginal cream 1 applicator, Nightly    topiramate (TOPAMAX) 50 mg, 2 times daily       Review of Systems   12 point review of systems conducted, negative except as stated in the history of present illness. See HPI for details.      Objective:     Visit Vitals  /77 (BP Location: Left arm, Patient Position: Sitting, BP Method: X-Large (Automatic))   Pulse 91   Ht 5' 4" (1.626 m)   Wt 118.5 kg (261 lb 4.8 oz)   LMP 2024   BMI 44.85 kg/m²        Physical Exam  Vitals and nursing note reviewed.   Constitutional:       Appearance: Normal appearance.      Comments: Increased BMI   HENT:      Head: Normocephalic and atraumatic.      Nose: Nose normal. No congestion.   Cardiovascular:      Rate and Rhythm: Normal rate.   Pulmonary:      Effort: Pulmonary effort is normal.   Skin:     Findings: No rash.   Neurological:      Mental Status: She is alert and oriented to person, place, and time.   Psychiatric:         Mood and Affect: Mood normal.         Behavior: Behavior normal.         Thought Content: Thought content normal.         Judgment: Judgment normal.         Assessment/Plan:     34 y.o.  female with IUP at 25w6d    Advanced maternal age at 34  There is normal fetal growth with an EFW of 1014 g at the 65% and the AC at the 83% on 24.  AFV is normal.      Reviewed risks with AMA, including risks for PTL, FGR, anomalies not seen, aneuploidy and stillbirth at term. She previously declined amniocentesis . She is aware of need for  evaluation. She previously had cell free DNA that " was negative .    I recommend fetal growth ultrasound in 5 weeks, and we will try to complete fetal anatomy scan at that time.  She was advised to continue fetal kick counts 3 times daily and PRN, with immediate reporting of decreased fetal movements (<10 movements/hr).      Chronic hypertension  Chronic hypertension is associated with significant adverse pregnancy outcomes including  birth, fetal growth restriction, fetal demise, placental abruption, and  delivery. Based on findings of recent CHAP Study, it is recommended to utilize 140/90 as the threshold for initiation or titration of medical therapy for chronic hypertension in pregnancy, rather than the previously recommended threshold of 160/110. For patients on blood pressure medications at the start of pregnancy, in the absence of mitigating factors or side effects, they can be maintained on their medications, rather than discontinuing them and waiting to initiate treatment for blood pressures in the severe range.    BP Readings from Last 1 Encounters:   24 125/77     With this BP, she was advised to follow low sodium diet with no medication at this time.     Low dose aspirin as discussed.    She would benefit from follow-up ultrasounds to monitor growth in 5 weeks and then every 4 weeks until the end of pregnancy. Recommend fetal testing starting around 32 weeks gestation until the end of pregnancy.    Among patients with uncomplicated chronic hypertension with no additional risk factors, delivery at 38-39 weeks appears to provide optimal balance between the risk of adverse fetal and  outcomes. If no medication and normal fetal assessment, recommend delivery at 39 weeks. However, will reassess closer to EDC to determine optimal timeframe or GA for delivery, based on current evaluation at that time.        Increased BMI over 40  Body mass index is 44.85 kg/m². With relatively stable weight recently, she was advised to continue  healthy low caloric and low salt diet. Excess weight gain would be associated with gestational hypertension, gestational diabetes and adverse  outcomes, including fetal demise in utero.    I recommend 1 hour GCT between 24-28 weeks' gestation.    With elevated BMI, fetal testing as discussed elsewhere. She was advised to do fetal kick counts 3 times daily and PRN after 24 weeks, with immediate reporting of decreased fetal movements (<10 movements/hr).    Preoperative antibiotics and thromboprophylaxis with use of pneumatic compression devices is recommend in those with very elevated BMI undergoing  delivery. Thromboprophylaxis should also be used with those on prolonged immobilization.    It is important to lose weight after the pregnancy is over, especially before a future pregnancy was discussed. Breastfeeding may be an important tool in reducing the postpartum weight retention. Fetal risks were discussed with short term risk of fetal/ obesity and long term risk of adolescent component of metabolic syndrome.        History of 4 previous C-sections  She is aware of risks with previous  section, including improper placental implantation and abnormalities such as accreta, placental abruption and risks of uterine rupture with labor with need for emergency  section, and  morbidity/mortality risks associated with that. She is to report any significant cramping or vaginal bleeding.      With history of multiple c-sections, previously advised to consider avoiding future pregnancy with risks as above.        Grand multiparity with history of postpartum hemorrhage  There is potentially a high risk of recurrence. Expectant management is to be done. Recommended lagging the chart for risk of postpartum hemorrhage and having type and screen on admission at delivery.  Suggest giving utero tonic agents early to prevent the risk of bleeding.       Cystic fibrosis carrier  I  discussed with her that Cystic fibrosis is an autosomal recessive genetic disorder with chromosome 7 gene mutation, CFTR gene. It primarily affects non-  whites with incidence 1:2500 affecting pulmonary system, pancreas, GI tract and higher risk for infections.    She declined genetic amniocentesis and testing for FOB with need for  evaluation.       Preeclampsia prophylaxis  With her increased risk for preeclampsia, she agreed to continue asa 81 mg BID until 34 6/7 weeks, then decrease to once daily until delivery. Preeclampsia precautions reviewed.     Follow up in about 5 weeks (around 10/22/2024) for M follow-up, Repeat ultrasound.     Future Appointments   Date Time Provider Department Center   10/22/2024  9:00 AM Jayesh Patterson MD [facility] [address] [place]   10/22/2024  9:00 AM ROOM 2, [facility] [address] [place] Freeman PA-C     This note was created with the assistance of [employer] voice recognition software. There may be transcription errors as a result of using this technology, however minimal. Effort has been made to ensure accuracy of transcription, but any obvious errors or omissions should be clarified with the author of the document.

## 2024-09-17 ENCOUNTER — PROCEDURE VISIT (OUTPATIENT)
Dept: MATERNAL FETAL MEDICINE | Facility: CLINIC | Age: 34
End: 2024-09-17
Payer: MEDICAID

## 2024-09-17 ENCOUNTER — OFFICE VISIT (OUTPATIENT)
Dept: MATERNAL FETAL MEDICINE | Facility: CLINIC | Age: 34
End: 2024-09-17
Payer: MEDICAID

## 2024-09-17 VITALS
SYSTOLIC BLOOD PRESSURE: 125 MMHG | HEART RATE: 91 BPM | BODY MASS INDEX: 44.61 KG/M2 | HEIGHT: 64 IN | WEIGHT: 261.31 LBS | DIASTOLIC BLOOD PRESSURE: 77 MMHG

## 2024-09-17 DIAGNOSIS — E66.01 SEVERE OBESITY DUE TO EXCESS CALORIES AFFECTING PREGNANCY IN SECOND TRIMESTER: ICD-10-CM

## 2024-09-17 DIAGNOSIS — O10.012 PRE-EXISTING ESSENTIAL HYPERTENSION AFFECTING PREGNANCY IN SECOND TRIMESTER: Primary | ICD-10-CM

## 2024-09-17 DIAGNOSIS — O09.892 CYSTIC FIBROSIS CARRIER IN SECOND TRIMESTER, ANTEPARTUM: ICD-10-CM

## 2024-09-17 DIAGNOSIS — O09.42 GRAND MULTIPARITY WITH CURRENT PREGNANCY IN SECOND TRIMESTER: ICD-10-CM

## 2024-09-17 DIAGNOSIS — O10.012 PRE-EXISTING ESSENTIAL HYPERTENSION AFFECTING PREGNANCY IN SECOND TRIMESTER: ICD-10-CM

## 2024-09-17 DIAGNOSIS — O34.219 PREVIOUS CESAREAN DELIVERY AFFECTING PREGNANCY: ICD-10-CM

## 2024-09-17 DIAGNOSIS — Z14.1 CYSTIC FIBROSIS CARRIER IN SECOND TRIMESTER, ANTEPARTUM: ICD-10-CM

## 2024-09-17 DIAGNOSIS — O99.212 SEVERE OBESITY DUE TO EXCESS CALORIES AFFECTING PREGNANCY IN SECOND TRIMESTER: ICD-10-CM

## 2024-09-17 DIAGNOSIS — O09.522 MULTIGRAVIDA OF ADVANCED MATERNAL AGE IN SECOND TRIMESTER: ICD-10-CM

## 2024-09-17 DIAGNOSIS — O09.299 HISTORY OF POSTPARTUM HEMORRHAGE, CURRENTLY PREGNANT: ICD-10-CM

## 2024-09-17 PROCEDURE — 3008F BODY MASS INDEX DOCD: CPT | Mod: CPTII,S$GLB,,

## 2024-09-17 PROCEDURE — 3078F DIAST BP <80 MM HG: CPT | Mod: CPTII,S$GLB,,

## 2024-09-17 PROCEDURE — 76816 OB US FOLLOW-UP PER FETUS: CPT | Mod: S$GLB,,, | Performed by: OBSTETRICS & GYNECOLOGY

## 2024-09-17 PROCEDURE — 3074F SYST BP LT 130 MM HG: CPT | Mod: CPTII,S$GLB,,

## 2024-09-17 PROCEDURE — 99213 OFFICE O/P EST LOW 20 MIN: CPT | Mod: TH,S$GLB,,

## 2024-09-17 PROCEDURE — 1160F RVW MEDS BY RX/DR IN RCRD: CPT | Mod: CPTII,S$GLB,,

## 2024-09-17 PROCEDURE — 1159F MED LIST DOCD IN RCRD: CPT | Mod: CPTII,S$GLB,,

## 2024-09-17 RX ORDER — ALPRAZOLAM 0.5 MG/1
0.5 TABLET ORAL 2 TIMES DAILY
COMMUNITY
Start: 2024-08-19

## 2024-09-17 RX ORDER — AMLODIPINE BESYLATE 5 MG/1
5 TABLET ORAL EVERY MORNING
COMMUNITY
Start: 2024-08-19

## 2024-09-17 RX ORDER — AZELASTINE 1 MG/ML
2 SPRAY, METERED NASAL 2 TIMES DAILY
COMMUNITY
Start: 2024-08-19

## 2024-09-17 RX ORDER — VITAMIN A, VITAMIN C, VITAMIN D, VITAMIN E, THIAMINE, RIBOFLAVIN, NIACIN, VITAMIN B6, FOLIC ACID, VITAMIN B12, CALCIUM, IRON, ZINC, COPPER 4000; 120; 400; 22; 1.84; 3; 20; 10; 1; 12; 200; 27; 25; 2 [IU]/1; MG/1; [IU]/1; [IU]/1; MG/1; MG/1; MG/1; MG/1; MG/1; UG/1; MG/1; MG/1; MG/1; MG/1
1 TABLET ORAL
COMMUNITY
Start: 2024-08-19

## 2024-09-17 RX ORDER — TOPIRAMATE 50 MG/1
50 TABLET, FILM COATED ORAL 2 TIMES DAILY
COMMUNITY
Start: 2024-08-19

## 2024-09-17 RX ORDER — SERTRALINE HYDROCHLORIDE 100 MG/1
100 TABLET, FILM COATED ORAL
COMMUNITY
Start: 2024-08-19

## 2024-10-15 DIAGNOSIS — O10.012 PRE-EXISTING ESSENTIAL HYPERTENSION AFFECTING PREGNANCY IN SECOND TRIMESTER: ICD-10-CM

## 2024-10-15 DIAGNOSIS — O99.213 OBESITY COMPLICATING PREGNANCY IN THIRD TRIMESTER: ICD-10-CM

## 2024-10-15 DIAGNOSIS — O10.013 PRE-EXISTING ESSENTIAL HYPERTENSION COMPLICATING PREGNANCY IN THIRD TRIMESTER: Primary | ICD-10-CM

## 2024-10-21 PROBLEM — O99.213 SEVERE OBESITY DUE TO EXCESS CALORIES AFFECTING PREGNANCY IN THIRD TRIMESTER: Status: ACTIVE | Noted: 2024-07-16

## 2024-10-21 PROBLEM — O09.43 GRAND MULTIPARITY WITH CURRENT PREGNANCY IN THIRD TRIMESTER: Status: ACTIVE | Noted: 2024-07-16

## 2024-10-21 PROBLEM — O10.013 PRE-EXISTING ESSENTIAL HYPERTENSION AFFECTING PREGNANCY IN THIRD TRIMESTER: Status: ACTIVE | Noted: 2024-07-16

## 2024-10-21 PROBLEM — O09.523 MULTIGRAVIDA OF ADVANCED MATERNAL AGE IN THIRD TRIMESTER: Status: ACTIVE | Noted: 2024-07-16

## 2024-10-21 NOTE — PROGRESS NOTES
Maternal Fetal Medicine Follow Up    Subjective:     Patient ID: [id number]    Chief Complaint: [facility] follow up with [place]      HPI: Linn Cali is a 34 y.o. female  at 30w6d gestation with Estimated Date of Delivery: 24  who is here for follow-up consultation by MFM.    She is of advanced maternal age and will be 34 by the DARY.  She had negative cell free DNA and declined amniocentesis.  She had increased BMI of 44.83 at initial MFM consult visit. She has history of chronic hypertension diagnosed in . She is currently on no antihypertensives.  She is on prophylactic low-dose aspirin twice daily.  She had carrier screening that was positive for cystic fibrosis carrier.  She has decided against any testing for FOB or amniocentesis.       Interval history since last MFM visit: None.. She denies any leaking fluid, vaginal bleeding, contractions, decreased fetal movement. Denies headaches, visual disturbances, or epigastric pain.    Pregnancy complications include:   Patient Active Problem List   Diagnosis    Multigravida of advanced maternal age in third trimester    Pre-existing essential hypertension affecting pregnancy in third trimester    Increased BMI over 40 affecting pregnancy in third trimester    At high risk for complications of intrauterine pregnancy (IUP)    Grand multiparity with current pregnancy in third trimester    Previous  delivery x4 affecting pregnancy    History of postpartum hemorrhage, currently pregnant    Cystic fibrosis carrier in second trimester, antepartum    upper normal fetal growth AC 2 weeks ahead at 93% on 10-22-24 in third trimester       No changes to medical, surgical, family, social, or obstetric history.    Medications:  Current Outpatient Medications   Medication Instructions    aspirin 81 mg    PNV no.153/FA/om3/dha/epa/fish (PRENATAL GUMMIES ORAL) Take by mouth.       Review of Systems   12 point review of systems conducted, negative except as stated  "in the history of present illness. See HPI for details.      Objective:     Visit Vitals  /73 (BP Location: Left arm, Patient Position: Sitting)   Pulse 84   Ht 5' 4" (1.626 m)   Wt 120.2 kg (265 lb)   LMP 2024   BMI 45.49 kg/m²        Physical Exam  Vitals and nursing note reviewed.   Constitutional:       Appearance: Normal appearance.      Comments: Increased BMI   HENT:      Head: Normocephalic and atraumatic.      Nose: Nose normal. No congestion.   Cardiovascular:      Rate and Rhythm: Normal rate.   Pulmonary:      Effort: Pulmonary effort is normal.   Skin:     Findings: No rash.   Neurological:      Mental Status: She is alert and oriented to person, place, and time.   Psychiatric:         Mood and Affect: Mood normal.         Behavior: Behavior normal.         Thought Content: Thought content normal.         Judgment: Judgment normal.         Assessment/Plan:     34 y.o.  female with IUP at 30w6d    Advanced maternal age at 34  There is upper normal fetal growth with an EFW of 1923 g at the 52% and the AC at the 93% on 10/22/24.  AFV is normal. BPP is 8/8 today (10/22/2024).     Reviewed risks with AMA, including risks for PTL, FGR, anomalies not seen, aneuploidy and stillbirth at term. She previously declined amniocentesis . She is aware of need for  evaluation. She previously had cell free DNA that was negative .    Fetal surveillance as below.    With a upper normal growth with the abdominal circumference about 2 weeks ahead, and increased BMI as the risk factor for excessive growth with the possibility of diabetes that was not picked up before, Accu-Chek done in the office was 72 4 hours postprandials, suggesting no evidence of diabetes with a negative earlier testing and increased BMI as main risk factor for this trend for accelerated growth.    Chronic hypertension  Chronic hypertension is associated with significant adverse pregnancy outcomes including  birth, fetal " growth restriction, fetal demise, placental abruption, and  delivery. Based on findings of recent CHAP Study, it is recommended to utilize 140/90 as the threshold for initiation or titration of medical therapy for chronic hypertension in pregnancy, rather than the previously recommended threshold of 160/110. For patients on blood pressure medications at the start of pregnancy, in the absence of mitigating factors or side effects, they can be maintained on their medications, rather than discontinuing them and waiting to initiate treatment for blood pressures in the severe range.    BP Readings from Last 1 Encounters:   10/22/24 115/73   With this BP, she was advised to follow low sodium diet with no medication at this time.     Low dose aspirin as discussed.    She would benefit from follow-up ultrasounds to monitor growth every 4 weeks until the end of pregnancy. Recommend fetal testing starting around 32 weeks gestation that could be done weekly initially with Dr. Cartwright and will do twice weekly testing after 35 weeks with chronic hypertension and BMI over 45 that will alternate with a him..    Among patients with uncomplicated chronic hypertension with no additional risk factors, delivery at 38-39 weeks appears to provide optimal balance between the risk of adverse fetal and  outcomes. However, will reassess closer to EDC to determine optimal timeframe or GA for delivery, based on current evaluation at that time.        Increased BMI over 40  Body mass index is 45.49 kg/m². With relatively stable weight recently, she was advised to continue healthy low caloric and low salt diet. Excess weight gain would be associated with worsening hypertension, gestational diabetes and adverse  outcomes, including fetal demise in utero.    Preoperative antibiotics and thromboprophylaxis with use of pneumatic compression devices is recommend in those with very elevated BMI undergoing  delivery.  Thromboprophylaxis should also be used with those on prolonged immobilization.    It is important to lose weight after the pregnancy is over, especially before a future pregnancy was discussed. Breastfeeding may be an important tool in reducing the postpartum weight retention. Fetal risks were discussed with short term risk of fetal/ obesity and long term risk of adolescent component of metabolic syndrome.        History of 4 previous C-sections  She is aware of risks with previous  section, including improper placental implantation and abnormalities such as accreta, placental abruption and risks of uterine rupture with labor with need for emergency  section, and  morbidity/mortality risks associated with that. She is to report any significant cramping or vaginal bleeding.      With history of multiple c-sections, previously advised to consider avoiding future pregnancy with risks as above.        Grand multiparity with history of postpartum hemorrhage  There is potentially a high risk of recurrence. Expectant management is to be done.     Recommended flagging the chart for risk of postpartum hemorrhage and having type and screen on admission at delivery.  Suggest giving utero tonic agents early to prevent the risk of bleeding.       Cystic fibrosis carrier  Previously discussed risks/implications.  She declined genetic amniocentesis and testing for FOB with need for  evaluation.       Preeclampsia prophylaxis  With her increased risk for preeclampsia, she agreed to continue asa 81 mg BID until 34 6/7 weeks, then decrease to once daily until delivery. Preeclampsia precautions reviewed.     Follow up in about 4 weeks (around 2024) for [facility] follow-up, BPP, Repeat Ultrasound, Friday.     Future Appointments   Date Time Provider Department Center   2024  8:30 AM Jayesh Patterson MD [facility] [place]   2024  8:30 AM ROOM 1, [facility] [place]        Patient was evaluated and examined by Dr. Patterson. TRAVIS Doyle, helped in pre charting of part of note.     This note was created with the assistance of [employer] voice recognition software. There may be transcription errors as a result of using this technology, however minimal. Effort has been made to ensure accuracy of transcription, but any obvious errors or omissions should be clarified with the author of the document.

## 2024-10-22 ENCOUNTER — OFFICE VISIT (OUTPATIENT)
Dept: MATERNAL FETAL MEDICINE | Facility: CLINIC | Age: 34
End: 2024-10-22
Payer: MEDICAID

## 2024-10-22 ENCOUNTER — PROCEDURE VISIT (OUTPATIENT)
Dept: MATERNAL FETAL MEDICINE | Facility: CLINIC | Age: 34
End: 2024-10-22
Payer: MEDICAID

## 2024-10-22 VITALS
DIASTOLIC BLOOD PRESSURE: 73 MMHG | HEIGHT: 64 IN | SYSTOLIC BLOOD PRESSURE: 115 MMHG | HEART RATE: 84 BPM | BODY MASS INDEX: 45.24 KG/M2 | WEIGHT: 265 LBS

## 2024-10-22 DIAGNOSIS — O09.299 HISTORY OF POSTPARTUM HEMORRHAGE, CURRENTLY PREGNANT: ICD-10-CM

## 2024-10-22 DIAGNOSIS — O10.013 PRE-EXISTING ESSENTIAL HYPERTENSION COMPLICATING PREGNANCY IN THIRD TRIMESTER: ICD-10-CM

## 2024-10-22 DIAGNOSIS — O99.213 SEVERE OBESITY DUE TO EXCESS CALORIES AFFECTING PREGNANCY IN THIRD TRIMESTER: ICD-10-CM

## 2024-10-22 DIAGNOSIS — O09.892 CYSTIC FIBROSIS CARRIER IN SECOND TRIMESTER, ANTEPARTUM: ICD-10-CM

## 2024-10-22 DIAGNOSIS — O10.013 PRE-EXISTING ESSENTIAL HYPERTENSION AFFECTING PREGNANCY IN THIRD TRIMESTER: Primary | ICD-10-CM

## 2024-10-22 DIAGNOSIS — E66.01 SEVERE OBESITY DUE TO EXCESS CALORIES AFFECTING PREGNANCY IN THIRD TRIMESTER: ICD-10-CM

## 2024-10-22 DIAGNOSIS — O09.43 GRAND MULTIPARITY WITH CURRENT PREGNANCY IN THIRD TRIMESTER: ICD-10-CM

## 2024-10-22 DIAGNOSIS — Z14.1 CYSTIC FIBROSIS CARRIER IN SECOND TRIMESTER, ANTEPARTUM: ICD-10-CM

## 2024-10-22 DIAGNOSIS — O34.219 PREVIOUS CESAREAN DELIVERY AFFECTING PREGNANCY: ICD-10-CM

## 2024-10-22 DIAGNOSIS — O09.523 MULTIGRAVIDA OF ADVANCED MATERNAL AGE IN THIRD TRIMESTER: ICD-10-CM

## 2024-10-22 DIAGNOSIS — O99.213 OBESITY COMPLICATING PREGNANCY IN THIRD TRIMESTER: ICD-10-CM

## 2024-10-22 PROBLEM — O36.63X0 MATERNAL CARE FOR EXCESSIVE FETAL GROWTH IN THIRD TRIMESTER: Status: ACTIVE | Noted: 2024-10-22

## 2024-10-22 LAB — GLUCOSE SERPL-MCNC: 72 MG/DL (ref 70–110)

## 2024-10-22 PROCEDURE — 76819 FETAL BIOPHYS PROFIL W/O NST: CPT | Mod: S$GLB,,, | Performed by: OBSTETRICS & GYNECOLOGY

## 2024-10-22 PROCEDURE — 3074F SYST BP LT 130 MM HG: CPT | Mod: CPTII,S$GLB,, | Performed by: OBSTETRICS & GYNECOLOGY

## 2024-10-22 PROCEDURE — 1160F RVW MEDS BY RX/DR IN RCRD: CPT | Mod: CPTII,S$GLB,, | Performed by: OBSTETRICS & GYNECOLOGY

## 2024-10-22 PROCEDURE — 82962 GLUCOSE BLOOD TEST: CPT | Mod: ,,, | Performed by: OBSTETRICS & GYNECOLOGY

## 2024-10-22 PROCEDURE — 1159F MED LIST DOCD IN RCRD: CPT | Mod: CPTII,S$GLB,, | Performed by: OBSTETRICS & GYNECOLOGY

## 2024-10-22 PROCEDURE — 3078F DIAST BP <80 MM HG: CPT | Mod: CPTII,S$GLB,, | Performed by: OBSTETRICS & GYNECOLOGY

## 2024-10-22 PROCEDURE — 99213 OFFICE O/P EST LOW 20 MIN: CPT | Mod: TH,S$GLB,, | Performed by: OBSTETRICS & GYNECOLOGY

## 2024-10-22 PROCEDURE — 76816 OB US FOLLOW-UP PER FETUS: CPT | Mod: S$GLB,,, | Performed by: OBSTETRICS & GYNECOLOGY

## 2024-10-22 PROCEDURE — 3008F BODY MASS INDEX DOCD: CPT | Mod: CPTII,S$GLB,, | Performed by: OBSTETRICS & GYNECOLOGY

## 2024-10-22 RX ORDER — NAPROXEN SODIUM 220 MG/1
81 TABLET, FILM COATED ORAL
COMMUNITY
Start: 2024-10-15

## 2024-11-06 DIAGNOSIS — O99.213 SEVERE OBESITY DUE TO EXCESS CALORIES AFFECTING PREGNANCY IN THIRD TRIMESTER: ICD-10-CM

## 2024-11-06 DIAGNOSIS — Z14.1 CYSTIC FIBROSIS CARRIER IN SECOND TRIMESTER, ANTEPARTUM: ICD-10-CM

## 2024-11-06 DIAGNOSIS — O09.523 MULTIGRAVIDA OF ADVANCED MATERNAL AGE IN THIRD TRIMESTER: ICD-10-CM

## 2024-11-06 DIAGNOSIS — E66.01 SEVERE OBESITY DUE TO EXCESS CALORIES AFFECTING PREGNANCY IN THIRD TRIMESTER: ICD-10-CM

## 2024-11-06 DIAGNOSIS — O10.013 PRE-EXISTING ESSENTIAL HYPERTENSION AFFECTING PREGNANCY IN THIRD TRIMESTER: Primary | ICD-10-CM

## 2024-11-06 DIAGNOSIS — O09.892 CYSTIC FIBROSIS CARRIER IN SECOND TRIMESTER, ANTEPARTUM: ICD-10-CM

## 2024-11-06 DIAGNOSIS — O09.299 HISTORY OF POSTPARTUM HEMORRHAGE, CURRENTLY PREGNANT: ICD-10-CM

## 2024-11-06 DIAGNOSIS — O34.219 PREVIOUS CESAREAN DELIVERY AFFECTING PREGNANCY: ICD-10-CM

## 2024-11-21 NOTE — PROGRESS NOTES
Maternal Fetal Medicine Follow Up    Subjective:     Patient ID: [id number]    Chief Complaint: [facility] follow up w/us       HPI: Linn Cali is a 34 y.o. female  at 35w2d gestation with Estimated Date of Delivery: 24  who is here for follow-up consultation by M.    She is of advanced maternal age and will be 34 by the DARY.  She had negative cell free DNA and declined amniocentesis.  She had increased BMI of 44.83 at initial MFM consult visit. She has history of chronic hypertension diagnosed in . She is currently on no antihypertensives.  She is on prophylactic low-dose aspirin  daily.  She had carrier screening that was positive for cystic fibrosis carrier.  She has decided against any testing for FOB or amniocentesis.  She had upper normal fetal growth noted on 10/22/2024.       Interval history since last M visit: None.. She denies any leaking fluid, vaginal bleeding, contractions, decreased fetal movement. Denies headaches, visual disturbances, or epigastric pain.    Pregnancy complications include:   Patient Active Problem List   Diagnosis    Multigravida of advanced maternal age in third trimester    Pre-existing essential hypertension affecting pregnancy in third trimester    Increased BMI over 40 affecting pregnancy in third trimester    At high risk for complications of intrauterine pregnancy (IUP)    Grand multiparity with current pregnancy in third trimester    Previous  delivery x4 affecting pregnancy    History of postpartum hemorrhage, currently pregnant    Cystic fibrosis carrier in second trimester, antepartum    upper normal fetal growth AC 2 weeks ahead at 93% on 10-22-24 in third trimester       No changes to medical, surgical, family, social, or obstetric history.    Medications:  Current Outpatient Medications   Medication Instructions    aspirin 81 mg    PNV no.153/FA/om3/dha/epa/fish (PRENATAL GUMMIES ORAL) Take by mouth.       Review of Systems   12 point review  "of systems conducted, negative except as stated in the history of present illness. See HPI for details.      Objective:     Visit Vitals  /71 (BP Location: Left arm, Patient Position: Sitting)   Pulse 94   Ht 5' 4" (1.626 m)   Wt 120.7 kg (266 lb)   LMP 2024   BMI 45.66 kg/m²        Physical Exam  Vitals and nursing note reviewed.   Constitutional:       Appearance: Normal appearance.      Comments: Increased BMI   HENT:      Head: Normocephalic and atraumatic.      Nose: Nose normal. No congestion.   Cardiovascular:      Rate and Rhythm: Normal rate.   Pulmonary:      Effort: Pulmonary effort is normal.   Skin:     Findings: No rash.   Neurological:      Mental Status: She is alert and oriented to person, place, and time.   Psychiatric:         Mood and Affect: Mood normal.         Behavior: Behavior normal.         Thought Content: Thought content normal.         Judgment: Judgment normal.         Assessment/Plan:     34 y.o.  female with IUP at 35w2d    Advanced maternal age at 34  There is normal fetal growth with an EFW of 2771 g at the 42% and the AC at the 86% on 24.  AFV is normal. BPP is 8/8 today (2024).     Reviewed risks with AMA, including risks for PTL, FGR, anomalies not seen, aneuploidy and stillbirth at term. She previously declined amniocentesis . She is aware of need for  evaluation. She previously had cell free DNA that was negative .    Fetal surveillance as below.      Chronic hypertension  Chronic hypertension is associated with significant adverse pregnancy outcomes including  birth, fetal growth restriction, fetal demise, placental abruption, and  delivery. Based on findings of recent CHAP Study, it is recommended to utilize 140/90 as the threshold for initiation or titration of medical therapy for chronic hypertension in pregnancy, rather than the previously recommended threshold of 160/110. For patients on blood pressure medications at " the start of pregnancy, in the absence of mitigating factors or side effects, they can be maintained on their medications, rather than discontinuing them and waiting to initiate treatment for blood pressures in the severe range.    Vitals:    24 0830   BP: 113/71      With this BP, she was advised to follow low sodium diet with no medication at this time.     Low dose aspirin as discussed.    We will start fetal testing twice weekly at this time, the alternating with primary OB until delivery.    With with a history of chronic hypertension, BMI over 45, advanced maternal age, and 4 previous  sections.  recommend delivery at 38 weeks' gestation (38-38 6/7th weeks) as optimal balance between prematurity risks and risks of continued pregnancy. Earlier delivery may be needed for worsening maternal or fetal status.        Increased BMI over 40  Body mass index is 45.66 kg/m². With relatively stable weight recently, she was advised to continue healthy low caloric and low salt diet. Excess weight gain would be associated with worsening hypertension, gestational diabetes and adverse  outcomes, including fetal demise in utero.    Preoperative antibiotics and thromboprophylaxis with use of pneumatic compression devices is recommend in those with very elevated BMI undergoing  delivery. Thromboprophylaxis should also be used with those on prolonged immobilization.    It is important to lose weight after the pregnancy is over, especially before a future pregnancy was discussed. Breastfeeding may be an important tool in reducing the postpartum weight retention. Fetal risks were discussed with short term risk of fetal/ obesity and long term risk of adolescent component of metabolic syndrome.        History of 4 previous C-sections  She is aware of risks with previous  section, including improper placental implantation and abnormalities such as accreta, placental abruption and risks of  uterine rupture with labor with need for emergency  section, and  morbidity/mortality risks associated with that. She is to report any significant cramping or vaginal bleeding.      With history of multiple c-sections, previously advised to consider avoiding future pregnancy with risks as above.        Grand multiparity with history of postpartum hemorrhage  There is potentially a high risk of recurrence. Expectant management is to be done.     Recommended flagging the chart for risk of postpartum hemorrhage and having type and screen on admission at delivery.  Suggest giving utero tonic agents early to prevent the risk of bleeding.       Cystic fibrosis carrier  Previously discussed risks/implications.  She declined genetic amniocentesis and testing for FOB with need for  evaluation.       Preeclampsia prophylaxis  With her increased risk for preeclampsia, she agreed to continue asa 81 mg BID until 34 6/7 weeks, then decrease to once daily until delivery. Preeclampsia precautions reviewed.    Patient is seeing Dr. Cartwright next Wednesday for fetal testing and he could arrange for NST at the hospital on Friday or Saturday, as we are going to be closed on Friday next week.  Reached out to Dr. Cartwright.    Follow up in about 2 weeks (around 2024) for BP, MFM follow-up.     Future Appointments   Date Time Provider Department Center   2024 11:00 AM Jayesh Patterson MD [facility] [place]   2024 11:00 AM ROOM 2, [facility] [place]       Patient was evaluated and examined by Dr. Patterson. TRAVIS Doyle, helped in pre charting of part of note.     This note was created with the assistance of [employer] voice recognition software. There may be transcription errors as a result of using this technology, however minimal. Effort has been made to ensure accuracy of transcription, but any obvious errors or omissions should be clarified with the author of the document.

## 2024-11-22 ENCOUNTER — OFFICE VISIT (OUTPATIENT)
Dept: MATERNAL FETAL MEDICINE | Facility: CLINIC | Age: 34
End: 2024-11-22
Payer: MEDICAID

## 2024-11-22 ENCOUNTER — PROCEDURE VISIT (OUTPATIENT)
Dept: MATERNAL FETAL MEDICINE | Facility: CLINIC | Age: 34
End: 2024-11-22
Payer: MEDICAID

## 2024-11-22 ENCOUNTER — TELEPHONE (OUTPATIENT)
Dept: MATERNAL FETAL MEDICINE | Facility: CLINIC | Age: 34
End: 2024-11-22
Payer: MEDICAID

## 2024-11-22 VITALS
HEIGHT: 64 IN | SYSTOLIC BLOOD PRESSURE: 113 MMHG | HEART RATE: 94 BPM | DIASTOLIC BLOOD PRESSURE: 71 MMHG | WEIGHT: 266 LBS | BODY MASS INDEX: 45.41 KG/M2

## 2024-11-22 DIAGNOSIS — O09.523 MULTIGRAVIDA OF ADVANCED MATERNAL AGE IN THIRD TRIMESTER: ICD-10-CM

## 2024-11-22 DIAGNOSIS — Z14.1 CYSTIC FIBROSIS CARRIER IN SECOND TRIMESTER, ANTEPARTUM: ICD-10-CM

## 2024-11-22 DIAGNOSIS — O34.219 PREVIOUS CESAREAN DELIVERY AFFECTING PREGNANCY: ICD-10-CM

## 2024-11-22 DIAGNOSIS — O09.299 HISTORY OF POSTPARTUM HEMORRHAGE, CURRENTLY PREGNANT: ICD-10-CM

## 2024-11-22 DIAGNOSIS — O09.892 CYSTIC FIBROSIS CARRIER IN SECOND TRIMESTER, ANTEPARTUM: ICD-10-CM

## 2024-11-22 DIAGNOSIS — E66.01 SEVERE OBESITY DUE TO EXCESS CALORIES AFFECTING PREGNANCY IN THIRD TRIMESTER: ICD-10-CM

## 2024-11-22 DIAGNOSIS — O99.213 SEVERE OBESITY DUE TO EXCESS CALORIES AFFECTING PREGNANCY IN THIRD TRIMESTER: ICD-10-CM

## 2024-11-22 DIAGNOSIS — O10.013 PRE-EXISTING ESSENTIAL HYPERTENSION AFFECTING PREGNANCY IN THIRD TRIMESTER: ICD-10-CM

## 2024-11-22 DIAGNOSIS — O36.63X0 ENCOUNTER FOR MATERNAL CARE FOR EXCESSIVE FETAL GROWTH IN THIRD TRIMESTER, SINGLE OR UNSPECIFIED FETUS: ICD-10-CM

## 2024-11-22 DIAGNOSIS — O10.013 PRE-EXISTING ESSENTIAL HYPERTENSION AFFECTING PREGNANCY IN THIRD TRIMESTER: Primary | ICD-10-CM

## 2024-11-22 DIAGNOSIS — O09.43 GRAND MULTIPARITY WITH CURRENT PREGNANCY IN THIRD TRIMESTER: ICD-10-CM

## 2024-11-22 NOTE — TELEPHONE ENCOUNTER
----- Message from Bill Ordaz sent at 11/22/2024  9:16 AM CST -----  Call Dr. Mortensen's office to arrange for an NST next Friday or Saturday

## 2024-11-27 DIAGNOSIS — O99.213 SEVERE OBESITY DUE TO EXCESS CALORIES AFFECTING PREGNANCY IN THIRD TRIMESTER: ICD-10-CM

## 2024-11-27 DIAGNOSIS — O09.892 CYSTIC FIBROSIS CARRIER IN SECOND TRIMESTER, ANTEPARTUM: ICD-10-CM

## 2024-11-27 DIAGNOSIS — O09.43 GRAND MULTIPARITY WITH CURRENT PREGNANCY IN THIRD TRIMESTER: ICD-10-CM

## 2024-11-27 DIAGNOSIS — E66.01 SEVERE OBESITY DUE TO EXCESS CALORIES AFFECTING PREGNANCY IN THIRD TRIMESTER: ICD-10-CM

## 2024-11-27 DIAGNOSIS — O09.299 HISTORY OF POSTPARTUM HEMORRHAGE, CURRENTLY PREGNANT: ICD-10-CM

## 2024-11-27 DIAGNOSIS — Z14.1 CYSTIC FIBROSIS CARRIER IN SECOND TRIMESTER, ANTEPARTUM: ICD-10-CM

## 2024-11-27 DIAGNOSIS — O10.013 PRE-EXISTING ESSENTIAL HYPERTENSION AFFECTING PREGNANCY IN THIRD TRIMESTER: Primary | ICD-10-CM

## 2024-12-05 PROBLEM — O09.893 CYSTIC FIBROSIS CARRIER IN THIRD TRIMESTER, ANTEPARTUM: Status: ACTIVE | Noted: 2024-07-16

## 2024-12-05 NOTE — PROGRESS NOTES
Maternal Fetal Medicine Follow Up    Subjective:     Patient ID: [id number]    Chief Complaint: M follow up with [facility]      HPI: Linn Cali is a 34 y.o. female  at 37w2d gestation with Estimated Date of Delivery: 24  who is here for follow-up consultation by MFM.    She is of advanced maternal age and will be 34 by the DARY.  She had negative cell free DNA and declined amniocentesis.  She had increased BMI of 44.83 at initial MFM consult visit. She has history of chronic hypertension diagnosed in . She is currently on no antihypertensives.  She is on prophylactic low-dose aspirin  daily.  She had carrier screening that was positive for cystic fibrosis carrier.  She has decided against any testing for FOB or amniocentesis.  She had upper normal fetal growth noted on 10/22/2024.       Interval history since last MFM visit: None.. She denies any leaking fluid, vaginal bleeding, contractions, decreased fetal movement. Denies headaches, visual disturbances, or epigastric pain.    Pregnancy complications include:   Patient Active Problem List   Diagnosis    Multigravida of advanced maternal age in third trimester    Pre-existing essential hypertension affecting pregnancy in third trimester    Increased BMI over 40 affecting pregnancy in third trimester    At high risk for complications of intrauterine pregnancy (IUP)    Grand multiparity with current pregnancy in third trimester    Previous  delivery x4 affecting pregnancy    History of postpartum hemorrhage, currently pregnant    Cystic fibrosis carrier in third trimester, antepartum    upper normal fetal growth AC 2 weeks ahead at 93% on 10-22-24 in third trimester       No changes to medical, surgical, family, social, or obstetric history.    Medications:  Current Outpatient Medications   Medication Instructions    aspirin 81 mg    PNV no.153/FA/om3/dha/epa/fish (PRENATAL GUMMIES ORAL) Take by mouth.       Review of Systems   12 point review  "of systems conducted, negative except as stated in the history of present illness. See HPI for details.      Objective:     Visit Vitals  /74 (BP Location: Left arm, Patient Position: Sitting)   Pulse 85   Ht 5' 4" (1.626 m)   Wt 120.7 kg (266 lb)   LMP 2024   BMI 45.66 kg/m²        Physical Exam  Vitals and nursing note reviewed.   Constitutional:       Appearance: Normal appearance.      Comments: Increased BMI   HENT:      Head: Normocephalic and atraumatic.      Nose: Nose normal. No congestion.   Cardiovascular:      Rate and Rhythm: Normal rate.   Pulmonary:      Effort: Pulmonary effort is normal.   Skin:     Findings: No rash.   Neurological:      Mental Status: She is alert and oriented to person, place, and time.   Psychiatric:         Mood and Affect: Mood normal.         Behavior: Behavior normal.         Thought Content: Thought content normal.         Judgment: Judgment normal.         Assessment/Plan:     34 y.o.  female with IUP at 37w2d    Advanced maternal age at 34  There was normal fetal growth with an EFW of 2771 g at the 42% and the AC at the 86% on 24.  AFV is normal. BPP is 8/8 today (2024).     Reviewed risks with AMA, including risks for PTL, FGR, anomalies not seen, aneuploidy and stillbirth at term. She previously declined amniocentesis . She is aware of need for  evaluation. She previously had cell free DNA that was negative .    Fetal surveillance as below.      Chronic hypertension  Chronic hypertension is associated with significant adverse pregnancy outcomes including  birth, fetal growth restriction, fetal demise, placental abruption, and  delivery. Based on findings of recent CHAP Study, it is recommended to utilize 140/90 as the threshold for initiation or titration of medical therapy for chronic hypertension in pregnancy, rather than the previously recommended threshold of 160/110. For patients on blood pressure medications at " the start of pregnancy, in the absence of mitigating factors or side effects, they can be maintained on their medications, rather than discontinuing them and waiting to initiate treatment for blood pressures in the severe range.    Vitals:    24 1052   BP: 123/74        With this BP, she was advised to follow low sodium diet with no medication at this time.     Low dose aspirin as discussed.    Recommend twice weekly fetal testing, alternating with primary OB, until delivery.  Reviewed fetal kick count instructions.    With history of chronic hypertension, BMI over 45, advanced maternal age, and 4 previous  sections.  recommend delivery at 38 weeks' gestation (38-38 6/7th weeks) as optimal balance between prematurity risks and risks of continued pregnancy. Earlier delivery may be needed for worsening maternal or fetal status.      Increased BMI over 40  Body mass index is 45.66 kg/m². With stable weight since last visit, she was advised to continue healthy low caloric and low salt diet. Excess weight gain would be associated with worsening hypertension, gestational diabetes and adverse  outcomes, including fetal demise in utero.    Preoperative antibiotics and thromboprophylaxis with use of pneumatic compression devices is recommend in those with very elevated BMI undergoing  delivery. Thromboprophylaxis should also be used with those on prolonged immobilization.    It is important to lose weight after the pregnancy is over, especially before a future pregnancy was discussed. Breastfeeding may be an important tool in reducing the postpartum weight retention. Fetal risks were discussed with short term risk of fetal/ obesity and long term risk of adolescent component of metabolic syndrome.       History of 4 previous C-sections  She is aware of risks with previous  section, including improper placental implantation and abnormalities such as accreta, placental abruption  and risks of uterine rupture with labor with need for emergency  section, and  morbidity/mortality risks associated with that.  Reviewed instructions to report any significant cramping or vaginal bleeding.      With history of multiple c-sections, previously advised to consider avoiding future pregnancy with risks as above.        Grand multiparity with history of postpartum hemorrhage  Consider using prophylactic dose of uterotonic agents after delivery to reduce the risk of postpartum hemorrhage.    Recommend flagging the chart on admission as high risk for postpartum hemorrhage to have blood type and screened and be prepared in the event that that happens.      Cystic fibrosis carrier  Previously discussed risks/implications.  She declined genetic amniocentesis and testing for FOB with need for  evaluation.       Preeclampsia prophylaxis  With her risk factors for preeclampsia, she will continue asa 81 mg daily until delivery. Preeclampsia precautions reviewed.     Fetal testing is reassuring.  Patient is delivering next Friday.  Fetal kick count instructions.  Preeclampsia warnings.    No follow-ups on file.     No future appointments.    Patient was evaluated and examined by Dr. Patterson. HENRIETTA Grimm, helped in pre charting of part of note.     This note was created with the assistance of [employer] voice recognition software. There may be transcription errors as a result of using this technology, however minimal. Effort has been made to ensure accuracy of transcription, but any obvious errors or omissions should be clarified with the author of the document.

## 2024-12-06 ENCOUNTER — PROCEDURE VISIT (OUTPATIENT)
Dept: MATERNAL FETAL MEDICINE | Facility: CLINIC | Age: 34
End: 2024-12-06
Payer: MEDICAID

## 2024-12-06 ENCOUNTER — OFFICE VISIT (OUTPATIENT)
Dept: MATERNAL FETAL MEDICINE | Facility: CLINIC | Age: 34
End: 2024-12-06
Payer: MEDICAID

## 2024-12-06 VITALS
DIASTOLIC BLOOD PRESSURE: 74 MMHG | WEIGHT: 266 LBS | HEIGHT: 64 IN | HEART RATE: 85 BPM | BODY MASS INDEX: 45.41 KG/M2 | SYSTOLIC BLOOD PRESSURE: 123 MMHG

## 2024-12-06 DIAGNOSIS — O09.893 CYSTIC FIBROSIS CARRIER IN THIRD TRIMESTER, ANTEPARTUM: ICD-10-CM

## 2024-12-06 DIAGNOSIS — O10.013 PRE-EXISTING ESSENTIAL HYPERTENSION AFFECTING PREGNANCY IN THIRD TRIMESTER: ICD-10-CM

## 2024-12-06 DIAGNOSIS — O99.213 SEVERE OBESITY DUE TO EXCESS CALORIES AFFECTING PREGNANCY IN THIRD TRIMESTER: ICD-10-CM

## 2024-12-06 DIAGNOSIS — O09.90 AT HIGH RISK FOR COMPLICATIONS OF INTRAUTERINE PREGNANCY (IUP): Primary | ICD-10-CM

## 2024-12-06 DIAGNOSIS — E66.01 SEVERE OBESITY DUE TO EXCESS CALORIES AFFECTING PREGNANCY IN THIRD TRIMESTER: ICD-10-CM

## 2024-12-06 DIAGNOSIS — Z14.1 CYSTIC FIBROSIS CARRIER IN SECOND TRIMESTER, ANTEPARTUM: ICD-10-CM

## 2024-12-06 DIAGNOSIS — O36.63X0 ENCOUNTER FOR MATERNAL CARE FOR EXCESSIVE FETAL GROWTH IN THIRD TRIMESTER, SINGLE OR UNSPECIFIED FETUS: ICD-10-CM

## 2024-12-06 DIAGNOSIS — Z14.1 CYSTIC FIBROSIS CARRIER IN THIRD TRIMESTER, ANTEPARTUM: ICD-10-CM

## 2024-12-06 DIAGNOSIS — O09.299 HISTORY OF POSTPARTUM HEMORRHAGE, CURRENTLY PREGNANT: ICD-10-CM

## 2024-12-06 DIAGNOSIS — O09.892 CYSTIC FIBROSIS CARRIER IN SECOND TRIMESTER, ANTEPARTUM: ICD-10-CM

## 2024-12-06 DIAGNOSIS — O09.43 GRAND MULTIPARITY WITH CURRENT PREGNANCY IN THIRD TRIMESTER: ICD-10-CM

## 2024-12-06 DIAGNOSIS — O34.219 PREVIOUS CESAREAN DELIVERY AFFECTING PREGNANCY: ICD-10-CM

## 2024-12-06 DIAGNOSIS — O09.523 MULTIGRAVIDA OF ADVANCED MATERNAL AGE IN THIRD TRIMESTER: ICD-10-CM
